# Patient Record
Sex: MALE | Race: WHITE | Employment: OTHER | URBAN - METROPOLITAN AREA
[De-identification: names, ages, dates, MRNs, and addresses within clinical notes are randomized per-mention and may not be internally consistent; named-entity substitution may affect disease eponyms.]

---

## 2019-09-30 ENCOUNTER — HOSPITAL ENCOUNTER (OUTPATIENT)
Dept: NUTRITION | Age: 54
Discharge: HOME | End: 2019-09-30
Payer: COMMERCIAL

## 2019-09-30 PROCEDURE — 97802 MEDICAL NUTRITION INDIV IN: CPT

## 2019-09-30 NOTE — PROGRESS NOTES
NUTRITION ASSESSMENT    47 YOM pt referred with diagnosis of hepatomegaly, obesity, diabetes. Patient seen this am with his wife. He states that he is confused about what to eat. He has many questions regarding \"good foods\" and \"bad foods. \" He states that he has been trying to follow a low carb diet, but has read that he should eat more fat \"whiche doesn't makes since because fat is bad too. \" He states that he avoids potatoes, rice, and pasta because they are carbs and and are \"bad. \" He states that he has been paying more attention to calories in foods. He states he needs to know \"what not to eat. \"   He and his wife state that they have decreased eating out recently due possible food poisoning when eating at [employer] a few months ago. He states that he ate out yesterday because friends were in town and that it was a \"bad\" day. He does admit to eating fast and mindless snacking. He reports that he gained to his highest weight while on a cruise in June. He also reports that he is hungry often on the low carb diet and that he has less energy. He also states that he is tired of eating the same thin all the time. Medical history remarkable for recent diagnosis of DM (prediabetes), HTN, GERD, obesity.  Previous Dieting Attempts/Current Knowledge/Attitudes: Has attempted low carb diets in the past. Voices poor knowledge regarding healthy food choices and no prior nutrition education. o Eat-26 Score: 9 (normal).  Lifestyle/Cultural/Family Influence: He lives with his wife and 2 children.  Motivation: Wants to lower A1c, lose weight, and have more energy     Support: Wife     Barriers: knowledge     Behaviors indicate current stage of change is: Preparation (Transtheoretical Model). Anticipate fair compliance with recommendations.      Anthropometrics:   Ht: 66\" (167.6cm) Wt:224.5# (102kg)  BMI: 36.2 (obese)    IBW for ht: 142# (64.5kg)    Weight History:    Usual Weight: 205# Highest Weight: 235#(June of this year) Comfortable Weight: 215# Wants to Weigh: 195#     Nutrition Focused Physical Findings:   No overt muscle/fat loss noted. No C/O: Nausea,Vomiting,Diarrhea,Constipation,Early Satiety,Dysphagia,Poor Dentition,Taste Changes,Poor Appetite    Nutritionally Relevant Medications:  Metformin, Lisinopril    Supplements/Vitamins/Herbs:  MVI, Vit E    Nutritionally Relevant Labs:  8/20/2019   A1c 6.2       Physical Activity:  None    Sleep Habits:  6 hrs sleep/night    Food and Nutrient Intake:   Based on reported usual intake, pt consumes 3 meals/day with 1-2 snacks. Appears able to obtain appropriate nutritional choices as desired. Diet Recall:  Breakfast: cottage cheese, berries, coffee  Lunch: egg omlet, grits, 2 beers (atypical lunch - friends in town). A typical lunch is usually deli meats and cheese in a low carb wrap. Dinner: sausage and peppers  Snacks: low carb ice cream, nuts, fruit  Beverages: 0 oz water/d, 36oz diet soda/d, 10oz coffee/d    Diet Appears:   Likely Adequate in calcium containing choices   Inadequate in servings of fruits and vegetables   Inadequate in fiber   Adequate in protein   Low in added sugar   Inadequate in heart healthy fats    Food Allergies/Intolerances: None    Avoids: potatoes, rice, bread (\"high carb foods\")    Estimated Nutrition Needs:   EEN to maintain CBW (MSJ x 1.3-1.4): 6404-4339 kcal/day  EEN for weight loss (500 kcal reduction/day, +/- 10% for margin of error): 0550-2777 kcal/day  Protein:  g/day (20-25% EEN)  Carbohydrate: 214 g/day (50% EEN)  Fat: 48-57g/day (25-30%EEN)  Sat fat: 13-19 g/day (no more than7-10%)  Fluid: 7124-2751 mL/day (1mL/kcal)     NUTRITION DIAGNOSIS:  Food and Nutrition Related Knowledge Deficit related to lack of prior exposure or exposure to inaccurate nutrition related information as evidenced by diet recall, patient and wife with questions. .    NUTRITION INTERVENTIONS:  Appointment Length: 60 minutes  1.  Nutrition Prescription:   Modification of Meals and Snacks:   1. Increase: Complex Carbohydrate, Monounsaturated Fat, Omega 3 Fatty Acids and Fiber  2. Decrease: Overall Energy Intake, Simple Carbohydrate, Saturated Fat and Trans Fat  3. Schedule of intake: 3 meals and 1 snack per day  4. Manage Feeding Environment: Reduce distractions at meal time, practice mindful eating, eat with others     Recommendations not discussed in this appointment: Consistent CHO briefly discussed, but were unable to discuss specifics due to multiple questions. Patient would also likely benefit from addition information regarding water intake, sodium intake, and additional stress management strategies (Mindful Eating). 2. Nutrition Education:   Content:  o Explained that macro nutrients that their role in the body. Explained that all macro nutrients are needed in the diet, and that the types of nutrients consumed are more important. Explained MyPlate method for creating meals and explained that following MyPlate will naturally increase nutrient density and decrease caloric intake. The majority of the appointment was spent explaining how to create meals using MyPlate and answering questions on food preparation and cooking techniques.   o Explained the difference between simple sugars and complex carbs and food sources of each. Recommended that most CHO choices come from complex CHO foods (whole grains, fruits and vegetables) and limit simple sugars. Explained that following MyPlate for meals and snacks will increase intake of complex CHO. o Explained saturated fat vs unsaturated fat and food sources for each. Explained that overall decrease in saturated fat is recommended. Provided preparation and cooking techniques to reduce saturated fat. Recommended using leaner cuts of meat and chicken and increasing fish intake. Recommended avoiding processed meats due to high saturated fat and/or sodium content.  Recommended substituting olive oils and vegetable oils for saturated fats such as butter and heavy creams. Explained ways to  saturated fat intake in the foods that they enjoy. o Recommended to avoid trans fats. Explained usual sources of trans fats and how to read a label to determine if trans fats were in a food product.   o Materials Provided:  - MyPlate (MyPlate.gov)  - Non-starchy vegetable list  - Cut the Fat [employer])  - Cooking for a Happy Heart [employer])  - Trans Fat food label   Application: Collaborated with and wife on how current intake could be altered to more closely follow MyPlate method. Patient and wife were able to verbalize food sources of non-starchy vegetables via teach back.  Pt verbalized understanding of recommendations discussed. 3. Nutrition Counseling:   Motivational Interviewing: Partnership with client; Acceptance of current Stage of Change; Compassion; Evocation of Change Talk    Social Support: Encouraged regular contact with identified support system (wife is offering to be active participant in his changes as well as support for him.  Stress Management:  Discussed Mindful Eating techniques.  Cognitive Restructuring:  o Black and White Thinking- Spent a good deal of time explaining that there are no \"bad\" foods and how all foods fit into a healthy diet (except trans fats). Explained that balance, consistency, and moderation are the keys to success when adopting healthy eating habits. Patient stated several times, \"I just need to know what not to eat. \" Wife seemed to have better understanding of building healthy meals and overall balance with meals.  Goal Setting: Collaborated with pt to set SMART goals. Goal: Patient will achieve a 1-2#/week weight loss. Plan:           1. Make all meals following MyPlate method          *No other goals were determined due to time and patient with questions.     NUTRITION MONITORING/EVALUATION:  Follow Up Appointment: 10/16/19 at 10:30A    Follow Up Monitoring Plans: Patient's current stage of change is preparation (Transtheoretical Model). Patient would benefit from continued appointment with an RD until application achieved and maintained for at least 6 months. Will evaluate setbacks and successes via diet recall.     Espinoza Reyes RD, , LD  Outpatient Dietitian  W: [phone]

## 2019-10-16 ENCOUNTER — HOSPITAL ENCOUNTER (OUTPATIENT)
Dept: NUTRITION | Age: 54
Discharge: HOME | End: 2019-10-16
Payer: COMMERCIAL

## 2019-10-16 PROCEDURE — 97803 MED NUTRITION INDIV SUBSEQ: CPT

## 2019-10-16 NOTE — PROGRESS NOTES
NUTRITION FOLLOW UP ASSESSMENT  Patient seen in follow-up today with wife for DM management. He states that he has continued to try to eat lower CHO meals. He states that he has been able to increase non-starchy vegetables with lunch and dinner. He reports that he has been eating more fruit. Recall reveals: B- berries with cottage cheese or cereal or eggs and fruit; L- sandwich on low carb [employer] bread with salad; D- meat and non-starchy vegetable, 1/2 potato or mac and cheese; Snack - low carb ice cream bar and fruit. He is asking if there is a list of \"good foods\" to eat and \"bad foods\" to stay away from. He is asking if it is okay to eat mac and cheese and french fries. His wife reports that he had a low blood pressure event a few days ago. She states she thought his blood sugar was low and checked, but it was ~160, so she checked his blood pressure and it was low. He also complains that he has been constipated recently. .    Previous BW: 224.5#  Actual BW: 218.9#  Weight Change: -5.6# x ~2.5weeks         Estimated Nutrition Needs:   EEN to maintain CBW (MSJ x 1.3-1.4): 3288-9439 kcal/day  EEN for weight loss (500 kcal reduction/day, +/- 10% for margin of error): 7477-6565 kcal/day  Protein:  g/day (20-25% EEN)  Carbohydrate: 214 g/day (50% EEN)  Fat: 48-57g/day (25-30%EEN)  Sat fat: 13-19 g/day (no more than7-10%)  Fluid: 5775-5700 mL/day (1mL/kcal)       NUTRITION DIAGNOSIS:  Food and Nutrition Related Knowledge Deficit related to lack of prior exposure or exposure to inaccurate nutrition related information as evidenced by diet recall, patient and wife with questions. .    NUTRITION INTERVENTIONS:  Appointment Length: 30 minutes  1. Nutrition Prescription:  · Modification of Meals and Snacks:   1. Increase: Complex Carbohydrate, Monounsaturated Fat, Omega 3 Fatty Acids and Fiber  2.  Decrease: Overall Energy Intake, Simple Carbohydrate, Saturated Fat and Trans Fat  3. Schedule of intake: 3 meals and 1 snack per day  4. Manage Feeding Environment: Reduce distractions at meal time, practice mindful eating, eat with others     Recommendations not discussed in this appointment: Patient would also likely benefit from additional stress management strategies (Mindful Eating). 2. Nutrition Education:   Content:  o Reviewed recommended modifications above: Spent more time discussing consistent CHO. Explained serving sizes of PRO, CHO, and FAT and how to include in a healthy diet. Using MyPlate and meal planner sheets, provided examples on how to build healthy meals that are balanced. Explained the importance of consistency with CHO intake (not too much and not too little) and the effect on blood glucose. Explained portion sizes for foods and maintaining adequate portion sizes for overall kcal control with out having to count calories. Explained that regular foods are likely close to [facility] carb\" foods in regards to CHO g per serving. Recommended always including a PRO food when consuming a CHO food. o Discussed adequate fluid intake to promote healthy GI and blood pressure. Helped patient identify that he was consuming ~50% of his estimated fluid needs. Explained that adequate fiber and fluid intake are needed to avoid constipation. Explained that he has likely increased fiber intake with the increase in fruit and vegetables, and also needs to increase water intake to avoid constipation. Explained that water is also needed to support blood pressure and he may have had a low blood pressure due to being dehydrated. o Discussed recommendations for total daily sodium intake. Recommended taking the salt off the table. Allow for some sodium during food preparation, but avoiding adding additional sodium. Explained how to read a food label to identify the sodium content of foods.    o Reinforced that there are not \"good foods\" or \"bad foods\", but there are healthier ways to prepare foods and that it is really about moderation and the overall components of each meal/snack. o Reinforced replacing saturated fats with unsaturated fats. o Reinforced MyPlate for portions, consistency, and overall reduction of kcal.  o Materials Provided:  - MyPlate handout  - Meal planner sheets for breakfast, lunch, dinner, and snack with CHO, PRO, and FAT servings for each (Meals: CHO 4, PRO 3-4, FAT 3; Snack CHO 2, PRO 1-2, FAT 1)   Application: Patient and wife were able to identify food sources of CHO, PRO, and FAT and were able to plan 1 breakfaast meal.     Pt verbalized understanding of recommendations discussed. Anticipate fair compliance with recommendations. 3. Nutrition Counseling:   Motivational Interviewing: Partnership with client; Acceptance of current Stage of Change; Compassion; Evocation of Change Talk    Social Support: Encouraged regular contact with identified support system and RD.  Cognitive Restructuring:   Black and White Thinking- Patient still displays black and white thinking regarding \"good foods\" and \"bad foods. \" Spent a good deal of time helping him to understand that there are not \"good\" or \"bad\" foods, but there are healthier choices. Explained that foods that he enjoys are \"allowed\" in a balanced diet, but should be eaten with regards to the overall meal/snack. Explained that following MyPlate will help him identify where these foods fit.  Goal Setting: Collaborated with pt to reaffirm/revise SMART goals. Continued Goals: Patient will achieve a 1-2#/week weight loss. Plan:           1. Make all meals following MyPlate method - progressing to goal                 2. Consistent CHO (Meals 4, snacks 2)          3. Include a protein with each meal/snack    NUTRITION MONITORING/EVALUATION:  Follow Up Appointment: 11/20/19 at 10A    Pts assessed stage of change is: action (Transtheoretical Model).  Pt would benefit from continued appointments with a Registered Dietitian until behavior change is maintained for at least 6 months. Frequency of follow up dependent upon any setbacks or need for support. Follow Up Monitoring Plans: Will monitor any wt change. Will assess and address any barriers to or success with plans. Will review diet recall to compare with recommendations.      Shin العلي RD, , LD  Outpatient Dietitian  W: [phone]

## 2019-11-20 ENCOUNTER — HOSPITAL ENCOUNTER (OUTPATIENT)
Dept: NUTRITION | Age: 54
Discharge: HOME | End: 2019-11-20
Payer: COMMERCIAL

## 2019-11-20 PROCEDURE — 97803 MED NUTRITION INDIV SUBSEQ: CPT

## 2019-11-20 NOTE — PROGRESS NOTES
NUTRITION FOLLOW UP ASSESSMENT  DM management    RD Visit 11//20/19: 10A    Patient seen in follow-up today with his wife. They report it has been a \"bad week. \" Wife states that she has been cleaning out the freezer and they have been eating some foods they usually do not eat. She also reports that her son is very thin and a picky eater and she has been making higher kcal and foods for him. Patient states that he has gained weight because he has been eating carbs again. He still refers to foods as \"good\" or \"bad. \"   Recall reveals:  B - 1/2 Power bar or cup or cottage cheese (4% milk fat) or 2 slices of [employer] toast with 2 eggs and a slice of cheese  L - sandwich with 2 slices of low carb bread and deli meat or left over mac and cheese or sardines  D - chicken, scalloped potatoes, baked breaded okra  Snacks - low carb ice cream bar, olives, pickles  Patient states that he has been trying to increase fluid intake. He states that he has stopped drinking soda and is drinking mostly mineral water or tea. Previous BW: 218.9#  Actual BW: 225.8#  Weight Change: +6.9# x ~5weeks       Estimated Nutrition Needs:   EEN to maintain CBW (MSJ x 1.3-1.4): 1646-2212 kcal/day  EEN for weight loss (500 kcal reduction/day, +/- 10% for margin of error): 0576-9023 kcal/day  Protein:  g/day (20-25% EEN)  Carbohydrate: 214 g/day (50% EEN)  Fat: 48-57g/day (25-30%EEN)  Sat fat: 13-19 g/day (no more than7-10%)  Fluid: 8800-3179 mL/day (1mL/kcal)             NUTRITION DIAGNOSIS:  Food and Nutrition Related Knowledge Deficit related to lack of prior exposure or exposure to inaccurate nutrition related information as evidenced by diet recall, patient and wife with questions. .     NUTRITION INTERVENTIONS:  1. 60 minute Appointment  2. Nutrition Prescription:  · Modification of Meals and Snacks:   1. Increase: Complex Carbohydrate, Monounsaturated Fat, Omega 3 Fatty Acids and Fiber  2.  Decrease: Overall Energy Intake, Simple Carbohydrate, Saturated Fat and Trans Fat  3. Schedule of intake: 3 meals and 1 snack per day  4. Manage Feeding Environment: Reduce distractions   3. Nutrition Education:   Materials Provided and Discussed:  o Reinforced consistent CHO. Reinforced number of grams per serving of CHO. Reinforced number of servings per meal and snack. Reinforced that \"low carb\" versions of foods often have the same total CHO as the regular version. o Reinforced MyPlate for building healthy meals at all meals. Helped patient identify where changes could be made to am and noon meals to be more balanced. Provided tips for planning for am and noon meals. Reinforced that following MyPlate model for all meals and snacks will naturally increase nutrient content, decrease total kcal, and help with consistency of CHO. o Reinforced saturated fat vs unsaturated fat. Helped patient and wife identify sources of saturated fat that is being included daily (cheese, scalloped potatoes, mac and cheese). Provided tips for reducing saturated fats like choosing reduced fat cheese and dairy (25% milk fat cottage cheese instead of 4% milk fat, reduced fat sliced cheese).  Additional Content:  o Helped patient identify sources of added kcal: portion sizes of starches and proteins. Recommended if still hungry following a meal, to first allow time for fullness cues to happen, then if still hungry get seconds of non-starchy vegetables or fruit instead of additional protein or starch.  o Helped wife identify cooking preparation that is likely adding additional kcal (primarily from fat kcal). Provided resources for recipes to decrease added saturated fat and kcal.  o Recommended slowing down at meal times and using mindful techniques when eating. Putting the fork down between bites, having conversation, taking time to enjoy the flavors and aroma.      Application: Patient was able to verbalize ways to increase fruit and/or non-starchy vegetables with am and noon meals.     Pt verbalized understanding of recommendations discussed. Anticipate fair compliance with recommendations. 4.   Nutrition Counseling:   Motivational Interviewing:  o Reviewed motivations for change.  o Minimal change will result in minimal progress toward goals.  Problem Solving: Provided strategies for planning. Patient was able to apply by suggesting that he could pre-portion non-starchy vegetables for noon meals   Social Support: Encouraged regular contact with identified support system and RD.  Stress Management: Discussed Mindful Eating techniques.  Stimulus Control: Discussed Habit Loop and cues to action. Explained that this time of year brings extra challenges with regards to healthy eating and balance. Helped patient identify ways to reduce over eating during the holidays while allowing for foods/meals that he enjoys with family.  Cognitive Restructuring:  o Black and White Thinking- Continue to work with patient regarding \"good\" vs \"bad\" foods and focus on balance and moderation for health. Goal Setting: Collaborated with pt to set SMART goals. Stressed importance of realistic expectations. Continued Goals: Patient will achieve a 1-2#/week weight loss. Plan:           7. Make all meals following MyPlate method - some regression to goal                 2. Consistent CHO (Meals 4, snacks 2) - not progressing to goal          3. Include a protein with each meal/snack - progressing to goal      NUTRITION MONITORING/EVALUATION:  Follow Up Appointment: 1/22/2019 at [address] [place] [address]    Pt would benefit from continued appointments with a Registered Dietitian until behavior change is maintained for at least 6 months. Frequency of follow up dependent upon any setbacks or need for support. Follow Up Monitoring Plans:   Monitor wt and diet recall for behavior change. Assess/address barriers to goals.        Olive Ricks RD, , LD  Outpatient Dietitian  W: [phone]

## 2019-12-17 ENCOUNTER — TELEPHONE (OUTPATIENT)
Dept: NUTRITION | Age: 54
End: 2019-12-17

## 2019-12-17 NOTE — TELEPHONE ENCOUNTER
Nutrition Counseling: Contacted pt regarding referral. See notes documented in Nutrition Counseling Referral for details. No further follow-up contact from pt. Will close referral for this office and notify referring provider.     [facility]  [phone]      ]\

## 2020-02-19 ENCOUNTER — HOSPITAL ENCOUNTER (EMERGENCY)
Age: 55
Discharge: HOME | End: 2020-02-19
Attending: EMERGENCY MEDICINE
Payer: COMMERCIAL

## 2020-02-19 VITALS
RESPIRATION RATE: 16 BRPM | BODY MASS INDEX: 36.16 KG/M2 | WEIGHT: 225 LBS | TEMPERATURE: 98.1 F | DIASTOLIC BLOOD PRESSURE: 80 MMHG | HEIGHT: 66 IN | SYSTOLIC BLOOD PRESSURE: 128 MMHG | OXYGEN SATURATION: 93 % | HEART RATE: 90 BPM

## 2020-02-19 DIAGNOSIS — L50.9 URTICARIA: Primary | ICD-10-CM

## 2020-02-19 LAB
ALBUMIN SERPL-MCNC: 4.4 G/DL (ref 3.5–5)
ALBUMIN/GLOB SERPL: 1.4 {RATIO} (ref 1.2–3.5)
ALP SERPL-CCNC: 122 U/L (ref 50–136)
ALT SERPL-CCNC: 67 U/L (ref 12–65)
ANION GAP SERPL CALC-SCNC: 7 MMOL/L (ref 7–16)
AST SERPL-CCNC: 26 U/L (ref 15–37)
BASOPHILS # BLD: 0 K/UL (ref 0–0.2)
BASOPHILS NFR BLD: 0 % (ref 0–2)
BILIRUB SERPL-MCNC: 0.3 MG/DL (ref 0.2–1.1)
BUN SERPL-MCNC: 19 MG/DL (ref 6–23)
CALCIUM SERPL-MCNC: 8.9 MG/DL (ref 8.3–10.4)
CHLORIDE SERPL-SCNC: 103 MMOL/L (ref 98–107)
CO2 SERPL-SCNC: 28 MMOL/L (ref 21–32)
CREAT SERPL-MCNC: 0.89 MG/DL (ref 0.8–1.5)
DIFFERENTIAL METHOD BLD: NORMAL
EOSINOPHIL # BLD: 0.2 K/UL (ref 0–0.8)
EOSINOPHIL NFR BLD: 1 % (ref 0.5–7.8)
ERYTHROCYTE [DISTWIDTH] IN BLOOD BY AUTOMATED COUNT: 12.3 % (ref 11.9–14.6)
GLOBULIN SER CALC-MCNC: 3.1 G/DL (ref 2.3–3.5)
GLUCOSE SERPL-MCNC: 150 MG/DL (ref 65–100)
HCT VFR BLD AUTO: 43.2 % (ref 41.1–50.3)
HGB BLD-MCNC: 14.5 G/DL (ref 13.6–17.2)
IMM GRANULOCYTES # BLD AUTO: 0.1 K/UL (ref 0–0.5)
IMM GRANULOCYTES NFR BLD AUTO: 1 % (ref 0–5)
LYMPHOCYTES # BLD: 2 K/UL (ref 0.5–4.6)
LYMPHOCYTES NFR BLD: 18 % (ref 13–44)
MCH RBC QN AUTO: 30.1 PG (ref 26.1–32.9)
MCHC RBC AUTO-ENTMCNC: 33.6 G/DL (ref 31.4–35)
MCV RBC AUTO: 89.6 FL (ref 79.6–97.8)
MONOCYTES # BLD: 0.6 K/UL (ref 0.1–1.3)
MONOCYTES NFR BLD: 5 % (ref 4–12)
NEUTS SEG # BLD: 8.2 K/UL (ref 1.7–8.2)
NEUTS SEG NFR BLD: 74 % (ref 43–78)
NRBC # BLD: 0 K/UL (ref 0–0.2)
PLATELET # BLD AUTO: 309 K/UL (ref 150–450)
PMV BLD AUTO: 11 FL (ref 9.4–12.3)
POTASSIUM SERPL-SCNC: 3.9 MMOL/L (ref 3.5–5.1)
PROT SERPL-MCNC: 7.5 G/DL (ref 6.3–8.2)
RBC # BLD AUTO: 4.82 M/UL (ref 4.23–5.6)
SODIUM SERPL-SCNC: 138 MMOL/L (ref 136–145)
WBC # BLD AUTO: 11.1 K/UL (ref 4.3–11.1)

## 2020-02-19 PROCEDURE — 99284 EMERGENCY DEPT VISIT MOD MDM: CPT

## 2020-02-19 PROCEDURE — 85025 COMPLETE CBC W/AUTO DIFF WBC: CPT

## 2020-02-19 PROCEDURE — 80053 COMPREHEN METABOLIC PANEL: CPT

## 2020-02-19 RX ORDER — PREDNISONE 20 MG/1
40 TABLET ORAL DAILY
Qty: 8 TAB | Refills: 0 | Status: SHIPPED | OUTPATIENT
Start: 2020-02-19 | End: 2020-02-19 | Stop reason: SDUPTHER

## 2020-02-19 RX ORDER — RANITIDINE 150 MG/1
150 TABLET, FILM COATED ORAL 2 TIMES DAILY
Qty: 20 TAB | Refills: 0 | Status: SHIPPED | OUTPATIENT
Start: 2020-02-19 | End: 2020-02-29

## 2020-02-19 RX ORDER — RANITIDINE 150 MG/1
150 TABLET, FILM COATED ORAL 2 TIMES DAILY
Qty: 20 TAB | Refills: 0 | Status: SHIPPED | OUTPATIENT
Start: 2020-02-19 | End: 2020-02-19 | Stop reason: SDUPTHER

## 2020-02-19 RX ORDER — PREDNISONE 20 MG/1
40 TABLET ORAL DAILY
Qty: 8 TAB | Refills: 0 | Status: SHIPPED | OUTPATIENT
Start: 2020-02-19 | End: 2020-02-23

## 2020-02-19 NOTE — ED TRIAGE NOTES
Pt had increase in mibitric dose last Wednesday. Started having itching last night, too a benadryl and went to sleep. Woke up with hives and swelling around left eye. Pt denies trouble breathing. Pt given 0.3 epi IM, 8 mg decadron, and 25 mg benadryl and 25 mg claritin in his PCP office today. Sent here for further evaluation. Pt states he feels a little itchy and has a sore throat at this time. NAD. Eye swelling better per patient.

## 2020-02-19 NOTE — ED NOTES
I have reviewed discharge instructions with the patient. The patient verbalized understanding. Patient left ED via Discharge Method: ambulatory to Home with wife via POV. Opportunity for questions and clarification provided. Patient given 3 scripts. To continue your aftercare when you leave the hospital, you may receive an automated call from our care team to check in on how you are doing. This is a free service and part of our promise to provide the best care and service to meet your aftercare needs.  If you have questions, or wish to unsubscribe from this service please call [phone]. Thank you for Choosing our [address] [place] [address] Emergency Department.

## 2020-02-19 NOTE — ED PROVIDER NOTES
[facility] Emergency Department  Arrival Date/Time: 2/19/2020 @ 11:29 AM      Umesh Ware  MRN: [id number]      49 y.o. male    YOB: 1965   [phone] (home)     [facility] EMERGENCY DEPT Jud Carrillo  Seen on 2/19/2020 @ 12:02 PM      Today's Chief Complaint:   Chief Complaint   Patient presents with    Allergic Reaction     HPI: 41-year-old male presents to the emergency department with hives on his hands arms chest trunk and back    Went to his primary care physician was given a shot of epinephrine shot of Decadron and Claritin    At time of my evaluation rashes regressed significantly. He still has some urticaria across his waistband and on his arm under his shirt sleeve    He relates the rash to increased doses of my [place]. No shortness of breath no difficulty swallowing no tongue swelling       HPI    Review of Systems: Review of Systems   Constitutional: Negative for activity change and fever. Respiratory: Negative for shortness of breath. Cardiovascular: Negative for chest pain. Skin: Positive for rash. Past Medical History: Primary Care Doctor: Other, MD Veronica  Meds, PMH, PSHx, SocHx at end of this note     Allergies: Allergies   Allergen Reactions    Percocet [Oxycodone-Acetaminophen] Other (comments)         Key Anti-Platelet Anticoagulant Meds     The patient is on no antiplatelet meds or anticoagulants. Physical Exam:  Nursing documentation reviewed. Patient Vitals for the past 24 hrs:   Temp Pulse Resp BP SpO2   02/19/20 1138     93 %   02/19/20 1134    128/80 93 %   02/19/20 1038 98.1 °F (36.7 °C) 90 16 137/83 95 %     Vital signs were reviewed. Physical Exam  Vitals signs and nursing note reviewed. Constitutional:       Appearance: Normal appearance. He is not ill-appearing. Cardiovascular:      Rate and Rhythm: Normal rate and regular rhythm. Pulmonary:      Breath sounds: Normal breath sounds.    Skin:     Comments: Fading erythematous rash across the waistband at the crease of his arm and at his shirt sleeve       Neurological:      Mental Status: He is alert and oriented to person, place, and time. MEDICAL DECISION MAKING:   Differential Diagnosis:    MDM  Number of Diagnoses or Management Options  Urticaria:   Diagnosis management comments: Resolving urticaria uncertain source of the reaction is much better after an Afrin and histamine blockers    No shortness of breath no tongue or facial swelling        Risk of Complications, Morbidity, and/or Mortality  Presenting problems: moderate  Diagnostic procedures: minimal  Management options: moderate          Data/Management:    Lab findings during this visit:   Recent Results (from the past 48 hour(s))   CBC WITH AUTOMATED DIFF    Collection Time: 02/19/20 10:39 AM   Result Value Ref Range    WBC 11.1 4.3 - 11.1 K/uL    RBC 4.82 4.23 - 5.6 M/uL    HGB 14.5 13.6 - 17.2 g/dL    HCT 43.2 41.1 - 50.3 %    MCV 89.6 79.6 - 97.8 FL    MCH 30.1 26.1 - 32.9 PG    MCHC 33.6 31.4 - 35.0 g/dL    RDW 12.3 11.9 - 14.6 %    PLATELET 580 234 - 542 K/uL    MPV 11.0 9.4 - 12.3 FL    ABSOLUTE NRBC 0.00 0.0 - 0.2 K/uL    DF AUTOMATED      NEUTROPHILS 74 43 - 78 %    LYMPHOCYTES 18 13 - 44 %    MONOCYTES 5 4.0 - 12.0 %    EOSINOPHILS 1 0.5 - 7.8 %    BASOPHILS 0 0.0 - 2.0 %    IMMATURE GRANULOCYTES 1 0.0 - 5.0 %    ABS. NEUTROPHILS 8.2 1.7 - 8.2 K/UL    ABS. LYMPHOCYTES 2.0 0.5 - 4.6 K/UL    ABS. MONOCYTES 0.6 0.1 - 1.3 K/UL    ABS. EOSINOPHILS 0.2 0.0 - 0.8 K/UL    ABS. BASOPHILS 0.0 0.0 - 0.2 K/UL    ABS. IMM.  GRANS. 0.1 0.0 - 0.5 K/UL   METABOLIC PANEL, COMPREHENSIVE    Collection Time: 02/19/20 10:39 AM   Result Value Ref Range    Sodium 138 136 - 145 mmol/L    Potassium 3.9 3.5 - 5.1 mmol/L    Chloride 103 98 - 107 mmol/L    CO2 28 21 - 32 mmol/L    Anion gap 7 7 - 16 mmol/L    Glucose 150 (H) 65 - 100 mg/dL    BUN 19 6 - 23 MG/DL    Creatinine 0.89 0.8 - 1.5 MG/DL    GFR est AA >60 >60 ml/min/1.73m2    GFR est non-AA >60 >60 ml/min/1.73m2    Calcium 8.9 8.3 - 10.4 MG/DL    Bilirubin, total 0.3 0.2 - 1.1 MG/DL    ALT (SGPT) 67 (H) 12 - 65 U/L    AST (SGOT) 26 15 - 37 U/L    Alk. phosphatase 122 50 - 136 U/L    Protein, total 7.5 6.3 - 8.2 g/dL    Albumin 4.4 3.5 - 5.0 g/dL    Globulin 3.1 2.3 - 3.5 g/dL    A-G Ratio 1.4 1.2 - 3.5         Radiology studies during this visit: No results found. Medications given in the ED: Medications - No data to display    Recheck and Additional Documentation:  (use .addrecheck  . addsepsis   . addstroke   . addhip  . addhandoff  . addcctime)     Patient much improved. Procedure Documentation:   Procedures     Other ED Course Notes:        Assessment and Plan:    Impression:     ICD-10-CM ICD-9-CM   1. Urticaria L50.9 708.9     Disposition: Discharged   Follow-up:   Follow-up Information     Follow up With Specialties Details Why [id number] EMERGENCY DEPT Emergency Medicine   [facility] [id number]  [address] Med:   Discharge Medication List as of 2/19/2020 12:54 PM      CONTINUE these medications which have CHANGED    Details   Cetirizine (ZYRTEC) 10 mg cap Take 10 mg by mouth daily for 10 days. , Print, Disp-10 Cap, R-0      predniSONE (DELTASONE) 20 mg tablet Take 40 mg by mouth daily for 4 days. , Print, Disp-8 Tab, R-0      raNITIdine (ZANTAC) 150 mg tablet Take 1 Tab by mouth two (2) times a day for 10 days. , Print, Disp-20 Tab, R-0         CONTINUE these medications which have NOT CHANGED    Details   lisinopril-hydrochlorothiazide (PRINZIDE, ZESTORETIC) 20-12.5 mg per tablet Take  by mouth daily. , Historical Med      HYDROcodone-acetaminophen (NORCO)  mg tablet Take 1 Tab by mouth., Historical Med      simvastatin (ZOCOR) 20 mg tablet Take 20 mg by mouth nightly., Historical Med      cholecalciferol (VITAMIN D3) 1,000 unit tablet Take 1,000 Units by mouth daily. , Historical Med      b complex vitamins (B COMPLEX 1) tablet Take 1 Tab by mouth daily. , Historical Med      clonazePAM (KLONOPIN) 2 mg tablet Take 5 mg by mouth two (2) times a day., Historical Med      eszopiclone (LUNESTA) 2 mg tablet Take 2 mg by mouth nightly., Historical Med      Venlafaxine 150 mg tr24 Take 150 mg by mouth., Historical Med      lamoTRIgine (LAMICTAL) 150 mg tablet Take  by mouth daily. , Historical Med      traMADol (ULTRAM) 50 mg tablet One  Tab Oral every 4 - 6 hours with food as needed for pain, Print, Disp-15 Tab, R-0             Past Medical History:      Past Medical History:   Diagnosis Date    Bipolar 1 disorder ([facility] [address]     Depression     Hypertension      Past Surgical History:   Procedure Laterality Date    HX CARPAL TUNNEL RELEASE      left    HX CERVICAL DISKECTOMY      HX LUMBAR LAMINECTOMY      HX ORTHOPAEDIC      back surgery     Social History     Tobacco Use    Smoking status: Never Smoker    Smokeless tobacco: Never Used   Substance Use Topics    Alcohol use: Yes     Alcohol/week: 0.0 standard drinks     Comment: occ    Drug use: No     Prior to Admission Medications   Prescriptions Last Dose Informant Patient Reported? Taking? HYDROcodone-acetaminophen (NORCO)  mg tablet   Yes No   Sig: Take 1 Tab by mouth. Venlafaxine 150 mg tr24   Yes No   Sig: Take 150 mg by mouth.   b complex vitamins (B COMPLEX 1) tablet   Yes No   Sig: Take 1 Tab by mouth daily. cholecalciferol (VITAMIN D3) 1,000 unit tablet   Yes No   Sig: Take 1,000 Units by mouth daily. clonazePAM (KLONOPIN) 2 mg tablet   Yes No   Sig: Take 5 mg by mouth two (2) times a day. eszopiclone (LUNESTA) 2 mg tablet   Yes No   Sig: Take 2 mg by mouth nightly. lamoTRIgine (LAMICTAL) 150 mg tablet   Yes No   Sig: Take  by mouth daily. lisinopril-hydrochlorothiazide (PRINZIDE, ZESTORETIC) 20-12.5 mg per tablet   Yes No   Sig: Take  by mouth daily. simvastatin (ZOCOR) 20 mg tablet   Yes No   Sig: Take 20 mg by mouth nightly.    traMADol (ULTRAM) 50 mg tablet   No No   Sig: One  Tab Oral every 4 - 6 hours with food as needed for pain      Facility-Administered Medications: None

## 2020-04-01 ENCOUNTER — HOSPITAL ENCOUNTER (OUTPATIENT)
Dept: LAB | Age: 55
Discharge: HOME | End: 2020-04-01

## 2020-04-01 PROCEDURE — 88312 SPECIAL STAINS GROUP 1: CPT

## 2020-04-01 PROCEDURE — 88305 TISSUE EXAM BY PATHOLOGIST: CPT

## 2021-10-24 ENCOUNTER — HOSPITAL ENCOUNTER (EMERGENCY)
Age: 56
Discharge: HOME | End: 2021-10-24
Attending: EMERGENCY MEDICINE
Payer: MEDICARE

## 2021-10-24 ENCOUNTER — APPOINTMENT (OUTPATIENT)
Dept: CT IMAGING | Age: 56
End: 2021-10-24
Attending: EMERGENCY MEDICINE
Payer: MEDICARE

## 2021-10-24 VITALS
SYSTOLIC BLOOD PRESSURE: 137 MMHG | DIASTOLIC BLOOD PRESSURE: 85 MMHG | TEMPERATURE: 98.3 F | WEIGHT: 225 LBS | BODY MASS INDEX: 36.16 KG/M2 | OXYGEN SATURATION: 95 % | RESPIRATION RATE: 15 BRPM | HEART RATE: 96 BPM | HEIGHT: 66 IN

## 2021-10-24 DIAGNOSIS — K52.9 NONINFECTIOUS GASTROENTERITIS, UNSPECIFIED TYPE: Primary | ICD-10-CM

## 2021-10-24 LAB
ALBUMIN SERPL-MCNC: 3.8 G/DL (ref 3.5–5)
ALBUMIN/GLOB SERPL: 1.1 {RATIO} (ref 1.2–3.5)
ALP SERPL-CCNC: 104 U/L (ref 50–136)
ALT SERPL-CCNC: 88 U/L (ref 12–65)
ANION GAP SERPL CALC-SCNC: 7 MMOL/L (ref 7–16)
AST SERPL-CCNC: 34 U/L (ref 15–37)
BACTERIA URNS QL MICRO: 0 /HPF
BASOPHILS # BLD: 0 K/UL (ref 0–0.2)
BASOPHILS NFR BLD: 0 % (ref 0–2)
BILIRUB SERPL-MCNC: 0.4 MG/DL (ref 0.2–1.1)
BUN SERPL-MCNC: 16 MG/DL (ref 6–23)
CALCIUM SERPL-MCNC: 9.1 MG/DL (ref 8.3–10.4)
CASTS URNS QL MICRO: NORMAL /LPF
CHLORIDE SERPL-SCNC: 103 MMOL/L (ref 98–107)
CO2 SERPL-SCNC: 28 MMOL/L (ref 21–32)
CREAT SERPL-MCNC: 1.2 MG/DL (ref 0.8–1.5)
DIFFERENTIAL METHOD BLD: ABNORMAL
EOSINOPHIL # BLD: 0.1 K/UL (ref 0–0.8)
EOSINOPHIL NFR BLD: 0 % (ref 0.5–7.8)
EPI CELLS #/AREA URNS HPF: 0 /HPF
ERYTHROCYTE [DISTWIDTH] IN BLOOD BY AUTOMATED COUNT: 12.9 % (ref 11.9–14.6)
GLOBULIN SER CALC-MCNC: 3.6 G/DL (ref 2.3–3.5)
GLUCOSE SERPL-MCNC: 215 MG/DL (ref 65–100)
HCT VFR BLD AUTO: 45 % (ref 41.1–50.3)
HGB BLD-MCNC: 14.9 G/DL (ref 13.6–17.2)
IMM GRANULOCYTES # BLD AUTO: 0.1 K/UL (ref 0–0.5)
IMM GRANULOCYTES NFR BLD AUTO: 1 % (ref 0–5)
LIPASE SERPL-CCNC: 61 U/L (ref 73–393)
LYMPHOCYTES # BLD: 2.6 K/UL (ref 0.5–4.6)
LYMPHOCYTES NFR BLD: 13 % (ref 13–44)
MCH RBC QN AUTO: 29 PG (ref 26.1–32.9)
MCHC RBC AUTO-ENTMCNC: 33.1 G/DL (ref 31.4–35)
MCV RBC AUTO: 87.5 FL (ref 79.6–97.8)
MONOCYTES # BLD: 1.6 K/UL (ref 0.1–1.3)
MONOCYTES NFR BLD: 8 % (ref 4–12)
NEUTS SEG # BLD: 15.4 K/UL (ref 1.7–8.2)
NEUTS SEG NFR BLD: 78 % (ref 43–78)
NRBC # BLD: 0 K/UL (ref 0–0.2)
PLATELET # BLD AUTO: 357 K/UL (ref 150–450)
PMV BLD AUTO: 10.2 FL (ref 9.4–12.3)
POTASSIUM SERPL-SCNC: 3.7 MMOL/L (ref 3.5–5.1)
PROT SERPL-MCNC: 7.4 G/DL (ref 6.3–8.2)
RBC # BLD AUTO: 5.14 M/UL (ref 4.23–5.6)
RBC #/AREA URNS HPF: NORMAL /HPF
SODIUM SERPL-SCNC: 138 MMOL/L (ref 136–145)
WBC # BLD AUTO: 19.8 K/UL (ref 4.3–11.1)
WBC URNS QL MICRO: NORMAL /HPF

## 2021-10-24 PROCEDURE — 83690 ASSAY OF LIPASE: CPT

## 2021-10-24 PROCEDURE — 74011000258 HC RX REV CODE- 258: Performed by: EMERGENCY MEDICINE

## 2021-10-24 PROCEDURE — 74011250636 HC RX REV CODE- 250/636: Performed by: EMERGENCY MEDICINE

## 2021-10-24 PROCEDURE — 74011000636 HC RX REV CODE- 636: Performed by: EMERGENCY MEDICINE

## 2021-10-24 PROCEDURE — 80053 COMPREHEN METABOLIC PANEL: CPT

## 2021-10-24 PROCEDURE — 81015 MICROSCOPIC EXAM OF URINE: CPT

## 2021-10-24 PROCEDURE — 85025 COMPLETE CBC W/AUTO DIFF WBC: CPT

## 2021-10-24 PROCEDURE — 81003 URINALYSIS AUTO W/O SCOPE: CPT

## 2021-10-24 PROCEDURE — 99284 EMERGENCY DEPT VISIT MOD MDM: CPT

## 2021-10-24 PROCEDURE — 74177 CT ABD & PELVIS W/CONTRAST: CPT

## 2021-10-24 RX ORDER — SODIUM CHLORIDE 0.9 % (FLUSH) 0.9 %
5-10 SYRINGE (ML) INJECTION AS NEEDED
Status: DISCONTINUED | OUTPATIENT
Start: 2021-10-24 | End: 2021-10-24 | Stop reason: HOSPADM

## 2021-10-24 RX ORDER — CEFDINIR 300 MG/1
300 CAPSULE ORAL 2 TIMES DAILY
Qty: 14 CAPSULE | Refills: 0 | Status: SHIPPED | OUTPATIENT
Start: 2021-10-24 | End: 2021-10-31

## 2021-10-24 RX ORDER — SODIUM CHLORIDE 0.9 % (FLUSH) 0.9 %
5-10 SYRINGE (ML) INJECTION EVERY 8 HOURS
Status: DISCONTINUED | OUTPATIENT
Start: 2021-10-24 | End: 2021-10-24 | Stop reason: HOSPADM

## 2021-10-24 RX ORDER — SODIUM CHLORIDE 0.9 % (FLUSH) 0.9 %
10 SYRINGE (ML) INJECTION
Status: COMPLETED | OUTPATIENT
Start: 2021-10-24 | End: 2021-10-24

## 2021-10-24 RX ORDER — METRONIDAZOLE 500 MG/1
500 TABLET ORAL 2 TIMES DAILY
Qty: 14 TABLET | Refills: 0 | Status: SHIPPED | OUTPATIENT
Start: 2021-10-24 | End: 2021-10-31

## 2021-10-24 RX ADMIN — IOPAMIDOL 100 ML: 755 INJECTION, SOLUTION INTRAVENOUS at 12:48

## 2021-10-24 RX ADMIN — SODIUM CHLORIDE 100 ML: 900 INJECTION, SOLUTION INTRAVENOUS at 12:49

## 2021-10-24 RX ADMIN — SODIUM CHLORIDE 1000 ML: 900 INJECTION, SOLUTION INTRAVENOUS at 12:32

## 2021-10-24 RX ADMIN — Medication 10 ML: at 12:49

## 2021-10-24 NOTE — ED TRIAGE NOTES
Patient ambulatory to triage with mask in place. Patient reports n/v since Friday. Pt reports constipation from taking pain medications. Pt reports taking Fleet enema Friday with some relief. Pt reports left sided abd pain and tightness.

## 2021-10-24 NOTE — DISCHARGE INSTRUCTIONS
Follow-up with your doctor, call the office to make an appointment. Return to the emergency department if your symptoms worsen despite the prescribed antibiotics. Your large intestine has some inflammation consistent with colitis. This is likely why you are having your abdominal pain and had vomiting earlier.

## 2021-10-24 NOTE — ED NOTES
I have reviewed discharge instructions with the patient. The patient verbalized understanding. Patient left ED via Discharge Method: ambulatory to Home with self  Opportunity for questions and clarification provided. Patient given 2 scripts. To continue your aftercare when you leave the hospital, you may receive an automated call from our care team to check in on how you are doing. This is a free service and part of our promise to provide the best care and service to meet your aftercare needs.  If you have questions, or wish to unsubscribe from this service please call [phone]. Thank you for Choosing our [facility] Emergency Department.

## 2021-10-24 NOTE — ED PROVIDER NOTES
Patient is a 81-year-old male with a history of bipolar disorder and hypertension who presents with abdominal pain. He says he started having nausea and vomiting 2 nights ago. He states he had been constipated off and on for the past 2 weeks, states his wife is a nurse and gave him a fleets enema Friday night which made him feel better. Saturday morning he woke up with abdominal pain. He describes as left achy sharp pain which waxes and wanes. It has been constant, seemly worse when he lies down. He denies any further nausea and vomiting, has been eating and drinking normally. He denies similar pain in the past, has not taken any medicine for his symptoms. He got concerned about the persistence of the pain and came here for evaluation. He denies any migration of the pain, states it is generally in his lower abdomen worse on the left. Past Medical History:   Diagnosis Date    Bipolar 1 disorder ([facility] [address]     Depression     Hypertension        Past Surgical History:   Procedure Laterality Date    HX CARPAL TUNNEL RELEASE      left    HX CERVICAL DISKECTOMY      HX LUMBAR LAMINECTOMY      HX ORTHOPAEDIC      back surgery         Family History:   Problem Relation Age of Onset    Cancer Mother     Cancer Father     Headache Sister        Social History     Socioeconomic History    Marital status:      Spouse name: Not on file    Number of children: Not on file    Years of education: Not on file    Highest education level: Not on file   Occupational History    Not on file   Tobacco Use    Smoking status: Never Smoker    Smokeless tobacco: Never Used   Substance and Sexual Activity    Alcohol use:  Yes     Alcohol/week: 0.0 standard drinks     Comment: occ    Drug use: No    Sexual activity: Not on file   Other Topics Concern    Not on file   Social History Narrative    Not on file     Social Determinants of Health     Financial Resource Strain:     Difficulty of Paying Living Expenses:    Food Insecurity:     Worried About Running Out of Food in the Last Year:     [address] in the Last Year:    Transportation Needs:     Lack of Transportation (Medical):  Lack of Transportation (Non-Medical):    Physical Activity:     Days of Exercise per Week:     Minutes of Exercise per Session:    Stress:     Feeling of Stress :    Social Connections:     Frequency of Communication with Friends and Family:     Frequency of Social Gatherings with Friends and Family:     Attends Spiritism Services:     Active Member of Clubs or Organizations:     Attends Club or Organization Meetings:     Marital Status:    Intimate Partner Violence:     Fear of Current or Ex-Partner:     Emotionally Abused:     Physically Abused:     Sexually Abused: ALLERGIES: Myrbetriq [mirabegron] and Percocet [oxycodone-acetaminophen]    Review of Systems   Constitutional: Negative for chills and fever. Gastrointestinal: Positive for abdominal pain, nausea and vomiting. Negative for constipation. All other systems reviewed and are negative. Vitals:    10/24/21 1022 10/24/21 1039 10/24/21 1121   BP: 133/74  137/85   Pulse: (!) 115  96   Resp: 16  15   Temp: 98.3 °F (36.8 °C)     SpO2: 95% 96% 95%   Weight: 102.1 kg (225 lb)     Height: 5' 6\" (1.676 m)              Physical Exam  Vitals and nursing note reviewed. Constitutional:       Appearance: Normal appearance. He is well-developed. He is obese. HENT:      Head: Normocephalic and atraumatic. Eyes:      Conjunctiva/sclera: Conjunctivae normal.      Pupils: Pupils are equal, round, and reactive to light. Pulmonary:      Effort: Pulmonary effort is normal. No respiratory distress. Abdominal:      General: There is no distension. Palpations: Abdomen is soft. Tenderness: There is abdominal tenderness. There is no guarding or rebound. Hernia: No hernia is present.           Comments: Moderate tenderness to palpation diffuse left-sided abdomen, more acute in the left upper quadrant   Musculoskeletal:         General: Normal range of motion. Cervical back: Normal range of motion and neck supple. Skin:     General: Skin is warm and dry. Neurological:      Mental Status: He is alert and oriented to person, place, and time.           MDM       Procedures

## 2021-10-24 NOTE — PROGRESS NOTES
Pt states having a \"violent\" reaction to contrast dye in July of 21' at an [place] facility. \"I did not remember the scan and woke up to two nurse trying to hold me\". Pt was okayed to have scan by ordering MD Dr. Dustin Sky. Pt tolerated contrast dye well without complications today for the AP CT scan.

## 2021-12-16 PROBLEM — R55 NEAR SYNCOPE: Status: ACTIVE | Noted: 2021-12-16

## 2021-12-16 PROBLEM — R94.31 ABNORMAL HOLTER MONITOR FINDING: Status: ACTIVE | Noted: 2021-12-16

## 2021-12-16 PROBLEM — E78.00 PURE HYPERCHOLESTEROLEMIA: Status: ACTIVE | Noted: 2021-12-16

## 2021-12-16 PROBLEM — I10 PRIMARY HYPERTENSION: Status: ACTIVE | Noted: 2021-12-16

## 2021-12-16 PROBLEM — E11.9 TYPE 2 DIABETES MELLITUS WITHOUT COMPLICATION, WITHOUT LONG-TERM CURRENT USE OF INSULIN (HCC): Status: ACTIVE | Noted: 2021-12-16

## 2022-01-04 ENCOUNTER — HOSPITAL ENCOUNTER (OUTPATIENT)
Dept: LAB | Age: 57
Discharge: HOME | End: 2022-01-04

## 2022-01-04 PROCEDURE — 88305 TISSUE EXAM BY PATHOLOGIST: CPT

## 2022-01-14 PROBLEM — Z98.890 S/P LAMINECTOMY: Status: ACTIVE | Noted: 2022-01-14
